# Patient Record
Sex: MALE | Race: WHITE
[De-identification: names, ages, dates, MRNs, and addresses within clinical notes are randomized per-mention and may not be internally consistent; named-entity substitution may affect disease eponyms.]

---

## 2018-03-19 ENCOUNTER — HOSPITAL ENCOUNTER (EMERGENCY)
Dept: HOSPITAL 17 - NEPD | Age: 43
Discharge: HOME | End: 2018-03-19
Payer: SELF-PAY

## 2018-03-19 VITALS
OXYGEN SATURATION: 98 % | TEMPERATURE: 98 F | SYSTOLIC BLOOD PRESSURE: 131 MMHG | DIASTOLIC BLOOD PRESSURE: 67 MMHG | HEART RATE: 108 BPM | RESPIRATION RATE: 16 BRPM

## 2018-03-19 VITALS — BODY MASS INDEX: 21.72 KG/M2 | WEIGHT: 143.3 LBS | HEIGHT: 68 IN

## 2018-03-19 DIAGNOSIS — Z72.0: ICD-10-CM
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
## 2018-03-19 DIAGNOSIS — S30.0XXA: Primary | ICD-10-CM

## 2018-03-19 PROCEDURE — 72100 X-RAY EXAM L-S SPINE 2/3 VWS: CPT

## 2018-03-19 PROCEDURE — 99283 EMERGENCY DEPT VISIT LOW MDM: CPT

## 2018-03-19 NOTE — RADRPT
EXAM DATE/TIME:  03/19/2018 02:20 

 

[facility] COMPARISON:     

No previous studies available for comparison.

 

                     

INDICATIONS :     

Pain left lower back into left hip.

                     

 

MEDICAL HISTORY :     

None.          

 

SURGICAL HISTORY :     

None.   

 

ENCOUNTER:     

Initial                                        

 

ACUITY:     

1 day      

 

PAIN SCORE:     

9/10

 

LOCATION:     

Left  lumbar spine.

 

FINDINGS:     

Two view examination was performed.  There are five non-rib bearing vertebral bodies.  The vertebral 
bodies are in normal alignment without evidence of subluxation or scoliosis.  The disc spaces are jhoan
ntained.  The pedicles are intact.  Bony mineralization is normal.  No fracture is identified.

 

CONCLUSION:     Negative exam.

 

 

 

 Evan Martinez MD on March 19, 2018 at 3:19           

Board Certified Radiologist.

 This report was verified electronically.
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
